# Patient Record
Sex: MALE | Race: WHITE | NOT HISPANIC OR LATINO | Employment: OTHER | URBAN - NONMETROPOLITAN AREA
[De-identification: names, ages, dates, MRNs, and addresses within clinical notes are randomized per-mention and may not be internally consistent; named-entity substitution may affect disease eponyms.]

---

## 2022-08-02 ENCOUNTER — OFFICE VISIT (OUTPATIENT)
Dept: PULMONOLOGY | Facility: CLINIC | Age: 67
End: 2022-08-02

## 2022-08-02 ENCOUNTER — LAB (OUTPATIENT)
Dept: LAB | Facility: HOSPITAL | Age: 67
End: 2022-08-02

## 2022-08-02 VITALS
TEMPERATURE: 97.1 F | HEIGHT: 72 IN | OXYGEN SATURATION: 95 % | SYSTOLIC BLOOD PRESSURE: 130 MMHG | DIASTOLIC BLOOD PRESSURE: 70 MMHG | WEIGHT: 200 LBS | HEART RATE: 72 BPM | BODY MASS INDEX: 27.09 KG/M2

## 2022-08-02 DIAGNOSIS — J43.9 PULMONARY EMPHYSEMA, UNSPECIFIED EMPHYSEMA TYPE: ICD-10-CM

## 2022-08-02 DIAGNOSIS — R06.02 SHORTNESS OF BREATH: Primary | ICD-10-CM

## 2022-08-02 DIAGNOSIS — J44.9 CHRONIC OBSTRUCTIVE PULMONARY DISEASE, UNSPECIFIED COPD TYPE: ICD-10-CM

## 2022-08-02 DIAGNOSIS — J41.0 CHRONIC BRONCHITIS, SIMPLE: ICD-10-CM

## 2022-08-02 DIAGNOSIS — J30.89 OTHER ALLERGIC RHINITIS: ICD-10-CM

## 2022-08-02 DIAGNOSIS — F17.210 NICOTINE DEPENDENCE, CIGARETTES, UNCOMPLICATED: ICD-10-CM

## 2022-08-02 DIAGNOSIS — R06.02 SHORTNESS OF BREATH: ICD-10-CM

## 2022-08-02 PROCEDURE — 82104 ALPHA-1-ANTITRYPSIN PHENO: CPT

## 2022-08-02 PROCEDURE — 36415 COLL VENOUS BLD VENIPUNCTURE: CPT

## 2022-08-02 PROCEDURE — 82103 ALPHA-1-ANTITRYPSIN TOTAL: CPT

## 2022-08-02 PROCEDURE — 99204 OFFICE O/P NEW MOD 45 MIN: CPT | Performed by: INTERNAL MEDICINE

## 2022-08-02 RX ORDER — FLUTICASONE PROPIONATE 50 MCG
2 SPRAY, SUSPENSION (ML) NASAL DAILY
COMMUNITY

## 2022-08-02 RX ORDER — CARVEDILOL 12.5 MG/1
12.5 TABLET ORAL EVERY 12 HOURS SCHEDULED
COMMUNITY

## 2022-08-02 RX ORDER — ATORVASTATIN CALCIUM 40 MG/1
40 TABLET, FILM COATED ORAL DAILY
COMMUNITY
End: 2023-03-02

## 2022-08-02 RX ORDER — HYDROCODONE BITARTRATE AND ACETAMINOPHEN 5; 325 MG/1; MG/1
1 TABLET ORAL EVERY 8 HOURS PRN
COMMUNITY
Start: 2022-07-14

## 2022-08-02 RX ORDER — GLYCOPYRROLATE AND FORMOTEROL FUMARATE 9; 4.8 UG/1; UG/1
AEROSOL, METERED RESPIRATORY (INHALATION) EVERY 12 HOURS SCHEDULED
COMMUNITY
End: 2022-11-04

## 2022-08-02 RX ORDER — MELOXICAM 7.5 MG/1
7.5 TABLET ORAL DAILY
COMMUNITY
End: 2023-03-02

## 2022-08-02 RX ORDER — AMLODIPINE BESYLATE 5 MG/1
5 TABLET ORAL DAILY
COMMUNITY
End: 2023-03-02

## 2022-08-02 RX ORDER — AZELASTINE 1 MG/ML
1 SPRAY, METERED NASAL 2 TIMES DAILY PRN
Qty: 1 EACH | Refills: 5 | Status: SHIPPED | OUTPATIENT
Start: 2022-08-02 | End: 2023-03-02 | Stop reason: SDUPTHER

## 2022-08-02 RX ORDER — ALBUTEROL SULFATE 90 UG/1
AEROSOL, METERED RESPIRATORY (INHALATION) EVERY 4 HOURS
COMMUNITY
End: 2023-03-02 | Stop reason: SDUPTHER

## 2022-08-02 RX ORDER — CETIRIZINE HYDROCHLORIDE 10 MG/1
10 TABLET ORAL DAILY
COMMUNITY
End: 2023-03-02

## 2022-08-02 RX ORDER — ALBUTEROL SULFATE 2.5 MG/3ML
SOLUTION RESPIRATORY (INHALATION)
COMMUNITY

## 2022-08-02 RX ORDER — AZELASTINE 1 MG/ML
SPRAY, METERED NASAL
COMMUNITY
End: 2022-08-02

## 2022-08-02 RX ORDER — APREMILAST 30 MG/1
TABLET, FILM COATED ORAL 2 TIMES DAILY
COMMUNITY
Start: 2022-07-15

## 2022-08-02 NOTE — PROGRESS NOTES
"  CONSULT NOTE    Requested by:   Whit Mcnair DO Bobrowski, Brittany, DO      Chief Complaint   Patient presents with   • COPD       Subjective:  Brooks Brice is a 67 y.o. male.     History of Present Illness   Patient was sent in today for evaluation of shortness of breath. Patient says that for the past few years, he has had shortness of breath. Patient has had decreased exercise capacity that has been progressive for the past few years. Patient also says that he brings up phlegm in the morning along with cough.     he also reports having 1-2 recent episodes of \"bronchitis\", over the past 2 years.    Patient also notes having progressive worsening in his ability to walk up the hill or walk up a flight of stairs.     The patient is currently not on oxygen.     Patient reports smoking 3-4 packs per day for the past 40 years.  he is currently smoking 1.5 packs a day.    Patient does not have a family history of lung diseases.    Patient says that he has been using his nasal sprays on a regular basis and describes no significant ongoing issues other than occasional congestion.       The following portions of the patient's history were reviewed and updated as appropriate: allergies, current medications, past family history, past medical history, past social history and past surgical history.    Review of Systems   HENT: Negative.    Respiratory: Positive for cough, shortness of breath and wheezing. Negative for apnea, choking and chest tightness.    Cardiovascular: Negative for chest pain, palpitations and leg swelling.   All other systems reviewed and are negative.      History reviewed. No pertinent past medical history.   Past medical history includes hypertension, hyperlipidemia and allergic rhinitis.    Social History     Tobacco Use   • Smoking status: Current Every Day Smoker     Packs/day: 1.50     Types: Cigarettes     Start date: 1978   • Smokeless tobacco: Not on file   Substance Use Topics   • " "Alcohol use: Not Currently         Objective:  Visit Vitals  /70   Pulse 72   Temp 97.1 °F (36.2 °C) (Temporal)   Ht 182.9 cm (72\")   Wt 90.7 kg (200 lb)   SpO2 95%   BMI 27.12 kg/m²       Physical Exam  Vitals reviewed.   Constitutional:       Appearance: He is well-developed.   HENT:      Head:      Comments: No acute lesions noted     Nose:      Comments: Nasal erythema noted.     Mouth/Throat:      Mouth: Mucous membranes are moist.      Comments: Oropharynx was somewhat cobblestoned.  Neck:      Vascular: No JVD.   Cardiovascular:      Rate and Rhythm: Normal rate and regular rhythm.   Pulmonary:      Effort: Pulmonary effort is normal.      Breath sounds: Wheezing present. No rales.      Comments: Somewhat hyperresonant to percussion.  Somewhat decreased air entry.  Mild scattered wheezing noted.   Musculoskeletal:      Cervical back: Neck supple.      Right lower leg: No edema.      Left lower leg: No edema.      Comments: Gait was normal.   Skin:     General: Skin is warm and dry.   Neurological:      Mental Status: He is alert and oriented to person, place, and time.   Psychiatric:         Mood and Affect: Mood normal.         Behavior: Behavior normal.           Assessment/Plan:  Diagnoses and all orders for this visit:    1. Shortness of breath (Primary)  -     Overnight Sleep Oximetry Study; Future  -     Pulmonary Function Test; Future  -     Alpha - 1 - Antitrypsin Phenotype; Future    2. Chronic obstructive pulmonary disease, unspecified COPD type (HCC)  -     Overnight Sleep Oximetry Study; Future  -     Pulmonary Function Test; Future  -     Alpha - 1 - Antitrypsin Phenotype; Future    3. Chronic bronchitis, simple (HCC)    4. Nicotine dependence, cigarettes, uncomplicated  -      CT Chest Low Dose Cancer Screening WO; Future    5. Other allergic rhinitis    6. Pulmonary emphysema, unspecified emphysema type (HCC)  -     Overnight Sleep Oximetry Study; Future  -     Alpha - 1 - Antitrypsin " Phenotype; Future    Other orders  -     azelastine (ASTELIN) 0.1 % nasal spray; 1 spray into the nostril(s) as directed by provider 2 (Two) Times a Day As Needed for Rhinitis or Allergies. Use in each nostril as directed  Dispense: 1 each; Refill: 5        Return in about 3 months (around 11/2/2022) for Recheck, PFT F/U, Imaging, Overnight P Ox, For Fabi Johnson), ...Also 7 mths w/Janet.    DISCUSSION(if any):  Last CT scan was reviewed in great detail with the patient. Images reviewed personally.   Interval decrease in size of right basilar pulmonary nodule measuring 6 mm, previously 9 mm. It also showed centrilobular emphysema.     CT of the sinuses was also reviewed which did not show any acute abnormality.    I have also reviewed his provider's office note that mentions possible COPD and symptoms of URI with cough, headache and pharyngitis.     ===========================  ===========================    PFTs were ordered for follow up visit.     ===========================  ===========================    Orders as above.    I have told the patient, that in my view, shortness of breath is most likely from underlying chronic obstructive lung disease.     Patient was given education and demonstration on how to use the medicine.     Side effects, of prescribed medicines, discussed.    Patient was also instructed on compliance and adherence with instructions.     I have discussed the need to quit smoking as soon as possible.    Patient was offered modalities such as Chantix/nicotine patches/Wellbutrin to aid in smoking cessation.    The patient will get back to us regarding the choice, once a decision has been taken.     Patient was given reading material, as appropriate.     The patient belongs to the risk group for which lung cancer screening has been recommended. We will try to make arrangements for the same and this will be indicated soon. This has been ordered.    Patient was asked to call with any  concerns.     Patient will be followed clinically to assess for response to treatment and further recommendations will be made, based on response.    Due to symptoms suggestive of poorly controlled allergic rhinitis, he was prescribed additional nasal spray with the advise to use all the recommended/prescribed nasal sprays on a regular basis.      Dictated utilizing Dragon dictation.    This document was electronically signed by Steve Mancia MD on 08/02/22 at 10:51 EDT

## 2022-08-08 LAB
A1AT PHENOTYP SERPL IFE: ABNORMAL
A1AT SERPL-MCNC: 224 MG/DL (ref 101–187)

## 2022-08-18 ENCOUNTER — HOSPITAL ENCOUNTER (OUTPATIENT)
Dept: CT IMAGING | Facility: HOSPITAL | Age: 67
Discharge: HOME | End: 2022-08-18
Admitting: INTERNAL MEDICINE

## 2022-08-18 DIAGNOSIS — F17.210 NICOTINE DEPENDENCE, CIGARETTES, UNCOMPLICATED: ICD-10-CM

## 2022-08-18 PROCEDURE — 71271 CT THORAX LUNG CANCER SCR C-: CPT

## 2022-08-25 DIAGNOSIS — J44.9 CHRONIC OBSTRUCTIVE PULMONARY DISEASE, UNSPECIFIED COPD TYPE: ICD-10-CM

## 2022-08-25 DIAGNOSIS — R06.02 SHORTNESS OF BREATH: ICD-10-CM

## 2022-08-25 DIAGNOSIS — J43.9 PULMONARY EMPHYSEMA, UNSPECIFIED EMPHYSEMA TYPE: ICD-10-CM

## 2022-10-12 ENCOUNTER — TRANSCRIBE ORDERS (OUTPATIENT)
Dept: ADMINISTRATIVE | Facility: HOSPITAL | Age: 67
End: 2022-10-12

## 2022-10-12 DIAGNOSIS — R94.39 ABNORMAL STRESS TEST: Primary | ICD-10-CM

## 2022-10-12 DIAGNOSIS — I20.0 UNSTABLE ANGINA PECTORIS: ICD-10-CM

## 2022-10-25 ENCOUNTER — HOSPITAL ENCOUNTER (OUTPATIENT)
Facility: HOSPITAL | Age: 67
Setting detail: HOSPITAL OUTPATIENT SURGERY
Discharge: HOME | End: 2022-10-25
Attending: INTERNAL MEDICINE | Admitting: INTERNAL MEDICINE

## 2022-10-25 VITALS
HEIGHT: 70 IN | WEIGHT: 208.78 LBS | TEMPERATURE: 97.2 F | OXYGEN SATURATION: 97 % | SYSTOLIC BLOOD PRESSURE: 137 MMHG | BODY MASS INDEX: 29.89 KG/M2 | RESPIRATION RATE: 16 BRPM | DIASTOLIC BLOOD PRESSURE: 83 MMHG | HEART RATE: 71 BPM

## 2022-10-25 DIAGNOSIS — I20.0 UNSTABLE ANGINA PECTORIS: ICD-10-CM

## 2022-10-25 DIAGNOSIS — R94.39 ABNORMAL STRESS TEST: ICD-10-CM

## 2022-10-25 LAB
ANION GAP SERPL CALCULATED.3IONS-SCNC: 7 MMOL/L (ref 5–15)
BUN SERPL-MCNC: 14 MG/DL (ref 8–23)
BUN/CREAT SERPL: 13.6 (ref 7–25)
CALCIUM SPEC-SCNC: 9.7 MG/DL (ref 8.6–10.5)
CHLORIDE SERPL-SCNC: 104 MMOL/L (ref 98–107)
CO2 SERPL-SCNC: 28 MMOL/L (ref 22–29)
CREAT BLDA-MCNC: 1 MG/DL (ref 0.6–1.3)
CREAT SERPL-MCNC: 1.03 MG/DL (ref 0.76–1.27)
DEPRECATED RDW RBC AUTO: 45.7 FL (ref 37–54)
EGFRCR SERPLBLD CKD-EPI 2021: 79.6 ML/MIN/1.73
ERYTHROCYTE [DISTWIDTH] IN BLOOD BY AUTOMATED COUNT: 13.1 % (ref 12.3–15.4)
GLUCOSE SERPL-MCNC: 130 MG/DL (ref 65–99)
HCT VFR BLD AUTO: 47.4 % (ref 37.5–51)
HGB BLD-MCNC: 16.1 G/DL (ref 13–17.7)
MCH RBC QN AUTO: 32.1 PG (ref 26.6–33)
MCHC RBC AUTO-ENTMCNC: 34 G/DL (ref 31.5–35.7)
MCV RBC AUTO: 94.6 FL (ref 79–97)
PLATELET # BLD AUTO: 241 10*3/MM3 (ref 140–450)
PMV BLD AUTO: 9.5 FL (ref 6–12)
POTASSIUM SERPL-SCNC: 3.8 MMOL/L (ref 3.5–5.2)
RBC # BLD AUTO: 5.01 10*6/MM3 (ref 4.14–5.8)
SODIUM SERPL-SCNC: 139 MMOL/L (ref 136–145)
WBC NRBC COR # BLD: 10.08 10*3/MM3 (ref 3.4–10.8)

## 2022-10-25 PROCEDURE — 80048 BASIC METABOLIC PNL TOTAL CA: CPT | Performed by: INTERNAL MEDICINE

## 2022-10-25 PROCEDURE — 25010000002 HEPARIN (PORCINE) PER 1000 UNITS: Performed by: INTERNAL MEDICINE

## 2022-10-25 PROCEDURE — C1887 CATHETER, GUIDING: HCPCS | Performed by: INTERNAL MEDICINE

## 2022-10-25 PROCEDURE — 85027 COMPLETE CBC AUTOMATED: CPT | Performed by: INTERNAL MEDICINE

## 2022-10-25 PROCEDURE — C1874 STENT, COATED/COV W/DEL SYS: HCPCS | Performed by: INTERNAL MEDICINE

## 2022-10-25 PROCEDURE — 25010000002 FENTANYL CITRATE (PF) 50 MCG/ML SOLUTION: Performed by: INTERNAL MEDICINE

## 2022-10-25 PROCEDURE — C1769 GUIDE WIRE: HCPCS | Performed by: INTERNAL MEDICINE

## 2022-10-25 PROCEDURE — 0 IOPAMIDOL PER 1 ML: Performed by: INTERNAL MEDICINE

## 2022-10-25 PROCEDURE — C1725 CATH, TRANSLUMIN NON-LASER: HCPCS | Performed by: INTERNAL MEDICINE

## 2022-10-25 PROCEDURE — C1894 INTRO/SHEATH, NON-LASER: HCPCS | Performed by: INTERNAL MEDICINE

## 2022-10-25 PROCEDURE — C9600 PERC DRUG-EL COR STENT SING: HCPCS | Performed by: INTERNAL MEDICINE

## 2022-10-25 PROCEDURE — 82565 ASSAY OF CREATININE: CPT

## 2022-10-25 PROCEDURE — 93458 L HRT ARTERY/VENTRICLE ANGIO: CPT | Performed by: INTERNAL MEDICINE

## 2022-10-25 PROCEDURE — 25010000002 BIVALIRUDIN TRIFLUOROACETATE 250 MG RECONSTITUTED SOLUTION 1 EACH VIAL: Performed by: INTERNAL MEDICINE

## 2022-10-25 PROCEDURE — 36415 COLL VENOUS BLD VENIPUNCTURE: CPT

## 2022-10-25 PROCEDURE — 25010000002 MIDAZOLAM PER 1 MG: Performed by: INTERNAL MEDICINE

## 2022-10-25 PROCEDURE — 93005 ELECTROCARDIOGRAM TRACING: CPT | Performed by: INTERNAL MEDICINE

## 2022-10-25 DEVICE — XIENCE SKYPOINT™ EVEROLIMUS ELUTING CORONARY STENT SYSTEM 3.50 MM X 23 MM / RAPID-EXCHANGE
Type: IMPLANTABLE DEVICE | Status: FUNCTIONAL
Brand: XIENCE SKYPOINT™

## 2022-10-25 RX ORDER — LIDOCAINE HYDROCHLORIDE 10 MG/ML
INJECTION, SOLUTION EPIDURAL; INFILTRATION; INTRACAUDAL; PERINEURAL
Status: DISCONTINUED | OUTPATIENT
Start: 2022-10-25 | End: 2022-10-25 | Stop reason: HOSPADM

## 2022-10-25 RX ORDER — MIDAZOLAM HYDROCHLORIDE 1 MG/ML
INJECTION INTRAMUSCULAR; INTRAVENOUS
Status: DISCONTINUED | OUTPATIENT
Start: 2022-10-25 | End: 2022-10-25 | Stop reason: HOSPADM

## 2022-10-25 RX ORDER — CLOPIDOGREL BISULFATE 75 MG/1
TABLET ORAL
Status: DISCONTINUED | OUTPATIENT
Start: 2022-10-25 | End: 2022-10-25 | Stop reason: HOSPADM

## 2022-10-25 RX ORDER — FENTANYL CITRATE 50 UG/ML
INJECTION, SOLUTION INTRAMUSCULAR; INTRAVENOUS
Status: DISCONTINUED | OUTPATIENT
Start: 2022-10-25 | End: 2022-10-25 | Stop reason: HOSPADM

## 2022-10-25 RX ORDER — HEPARIN SODIUM 1000 [USP'U]/ML
INJECTION, SOLUTION INTRAVENOUS; SUBCUTANEOUS
Status: DISCONTINUED | OUTPATIENT
Start: 2022-10-25 | End: 2022-10-25 | Stop reason: HOSPADM

## 2022-10-25 RX ORDER — NICARDIPINE HCL-0.9% SOD CHLOR 1 MG/10 ML
SYRINGE (ML) INTRAVENOUS
Status: DISCONTINUED | OUTPATIENT
Start: 2022-10-25 | End: 2022-10-25 | Stop reason: HOSPADM

## 2022-10-25 RX ORDER — ASPIRIN 81 MG/1
81 TABLET ORAL DAILY
COMMUNITY

## 2022-10-25 RX ORDER — CLOPIDOGREL BISULFATE 75 MG/1
75 TABLET ORAL DAILY
Qty: 90 TABLET | Refills: 3 | Status: SHIPPED | OUTPATIENT
Start: 2022-10-25

## 2022-10-25 NOTE — H&P
[facility] History & Physical    Referring Provider: Bob Pabon MD    Patient Care Team:  Whit Mcnair DO as PCP - General (Family Medicine)    Chief complaint chest pain    Subjective .     History of present illness: 67-year-old smoker with hypertension and hyperlipidemia and type 2 diabetes mellitus admitted for cardiac catheterization.  He has a 3-month history of increasing episodes of substernal chest pressure prompted by overactivity.  Pain is a pressure that radiates into his left shoulder and is associated with nausea and diaphoresis.  It last for a few minutes and relieved by rest.  He has had no prior episodes of myocardial myocardial infarction, stroke, TIA or congestive heart failure.  Echocardiography in the office revealed aortic valve sclerosis.  Myocardial perfusion imaging revealed a reversible inferior wall defect.  He is now been admitted for cardiac catheterization.    Review of System  A 14 point review of systems was negative except as was stated in the HPI    History  Past Medical History:   Diagnosis Date   • COPD (chronic obstructive pulmonary disease) (HCC)    • Hyperlipidemia    • Hypertension    • Psoriasis      Past Surgical History:   Procedure Laterality Date   • APPENDECTOMY       Family History   Problem Relation Age of Onset   • Emphysema Father      Social History     Tobacco Use   • Smoking status: Every Day     Packs/day: 1.50     Types: Cigarettes     Start date: 1978   • Smokeless tobacco: Never   Substance Use Topics   • Alcohol use: Not Currently   • Drug use: Never     Medications Prior to Admission   Medication Sig Dispense Refill Last Dose   • albuterol (PROVENTIL) (2.5 MG/3ML) 0.083% nebulizer solution albuterol sulfate 2.5 mg/3 mL (0.083 %) solution for nebulization      • albuterol sulfate  (90 Base) MCG/ACT inhaler Every 4 (Four) Hours.      • amLODIPine (NORVASC) 5 MG tablet Take 1 tablet by mouth Daily.   10/25/2022   •  "aspirin 81 MG EC tablet Take 1 tablet by mouth Daily.   10/24/2022   • atorvastatin (LIPITOR) 40 MG tablet Take 1 tablet by mouth Daily.   10/25/2022   • azelastine (ASTELIN) 0.1 % nasal spray 1 spray into the nostril(s) as directed by provider 2 (Two) Times a Day As Needed for Rhinitis or Allergies. Use in each nostril as directed 1 each 5    • carvedilol (COREG) 12.5 MG tablet Take 1 tablet by mouth Every 12 (Twelve) Hours.   10/25/2022   • cetirizine (zyrTEC) 10 MG tablet Take 1 tablet by mouth Daily.   10/25/2022   • fluticasone (FLONASE) 50 MCG/ACT nasal spray 2 sprays into the nostril(s) as directed by provider Daily.   10/25/2022   • Glycopyrrolate-Formoterol (Bevespi Aerosphere) 9-4.8 MCG/ACT aerosol Every 12 (Twelve) Hours.   10/25/2022   • HYDROcodone-acetaminophen (NORCO) 5-325 MG per tablet Take 1 tablet by mouth Every 8 (Eight) Hours As Needed.   10/24/2022   • meloxicam (MOBIC) 7.5 MG tablet Take 1 tablet by mouth Daily.   10/25/2022   • Otezla 30 MG tablet Take  by mouth 2 (Two) Times a Day.   10/25/2022     Scheduled Meds:    Continuous Infusions:  No current facility-administered medications for this encounter.    PRN Meds:    Allergies:  Patient has no known allergies.    Objective     Vital Sign Min/Max for last 24 hours  Temp  Min: 97.2 °F (36.2 °C)  Max: 97.2 °F (36.2 °C)   BP  Min: 136/94  Max: 145/83   Pulse  Min: 72  Max: 72   No data recorded   SpO2  Min: 97 %  Max: 97 %   No data recorded   Weight  Min: 94.7 kg (208 lb 12.4 oz)  Max: 94.7 kg (208 lb 12.4 oz)     Flowsheet Rows    Flowsheet Row First Filed Value   Admission Height 177.8 cm (70\") Documented at 10/25/2022 0621   Admission Weight 94.7 kg (208 lb 12.4 oz) Documented at 10/25/2022 0621             Physical Exam:  General Appearance: Alert, appears stated age and cooperative  Lungs: Clear to ascultation  Heart:: RRR   No Murmurs, Rubs or Gallops  Abdomen: Soft and nontender with adequate bowel sounds.  No " organomegaly  Extremities: No cyanosis, clubbing or edema  Pulses: Pulses palpable and equal bilaterally  Skin: Warm and dry with no rash  Psych: Normal    Results Review:   I reviewed the patient's new clinical results.  Results from last 7 days   Lab Units 10/25/22  0623   WBC 10*3/mm3 10.08   HEMOGLOBIN g/dL 16.1   HEMATOCRIT % 47.4   PLATELETS 10*3/mm3 241     Results from last 7 days   Lab Units 10/25/22  0629   CREATININE mg/dL 1.00     No results found for: TROPONINT                      Abnormal stress test    Unstable angina pectoris (HCC)      Impression      Chest pain/class III angina with abnormal myocardial perfusion imaging        Plan       Left heart catheterization with possible intervention today      I discussed the patients findings and my recommendations with patient    Bob Pabon MD   10/25/22  07:11 EDT

## 2022-10-26 ENCOUNTER — DOCUMENTATION (OUTPATIENT)
Dept: CARDIAC REHAB | Facility: HOSPITAL | Age: 67
End: 2022-10-26

## 2022-10-26 ENCOUNTER — CALL CENTER PROGRAMS (OUTPATIENT)
Dept: CALL CENTER | Facility: HOSPITAL | Age: 67
End: 2022-10-26

## 2022-10-26 LAB
QT INTERVAL: 434 MS
QTC INTERVAL: 447 MS

## 2022-10-26 NOTE — OUTREACH NOTE
PCI/Device Survey    Flowsheet Row Responses   Facility patient discharged from? [facility]   Procedure date 10/25/22   Procedure (if device, specify in description) PCI   PCI site Right, Arm   Performing MD Dr. Bob Pabon   Attempt successful? Yes   Call start time [phone]   Call end time [phone]   Has the patient had any of the following symptoms since discharge? Dizziness or lightheadedness   Nursing interventions Advised to call MD   Symptom comments having a little lightheadedness but he is resting today, says he will call MD if it does not improve or gets worse   Is the patient taking prescribed medications: Plavix   Nursing intervention Reminded to continue to take prescribed medications   Does the patient have any of the following symptoms related to the cath/surgical site? --  [no symptoms noted]   Nursing intervention Patient education provided   Does the patient have an appointment scheduled with the cardiologist? No   Appointment comments says he was told they would call with a f/u appt   Did the patient feel prepared to go home on the same day as the procedure? Yes   Is the patient satisfied with the same day discharge process? Yes   PCI/Device call completed Yes   Wrap up additional comments Doing well, no questions.          BLANCA ALEJANDRO - Registered Nurse

## 2022-10-28 ENCOUNTER — DOCUMENTATION (OUTPATIENT)
Dept: CARDIAC REHAB | Facility: HOSPITAL | Age: 67
End: 2022-10-28

## 2022-10-28 NOTE — PROGRESS NOTES
Cardiac Rehab staff mailed referral letter to patient regarding Phase II Cardiac Rehab program. Instruction for patient to contact [facility] Cardiac Rehab Department for additional program information and to forward referral to closest Cardiac Rehab program.

## 2022-11-04 ENCOUNTER — OFFICE VISIT (OUTPATIENT)
Dept: PULMONOLOGY | Facility: CLINIC | Age: 67
End: 2022-11-04

## 2022-11-04 VITALS
OXYGEN SATURATION: 100 % | TEMPERATURE: 97.3 F | BODY MASS INDEX: 29.78 KG/M2 | HEIGHT: 70 IN | RESPIRATION RATE: 14 BRPM | SYSTOLIC BLOOD PRESSURE: 120 MMHG | HEART RATE: 81 BPM | DIASTOLIC BLOOD PRESSURE: 80 MMHG | WEIGHT: 208 LBS

## 2022-11-04 DIAGNOSIS — J43.9 PULMONARY EMPHYSEMA, UNSPECIFIED EMPHYSEMA TYPE: ICD-10-CM

## 2022-11-04 DIAGNOSIS — J44.9 CHRONIC OBSTRUCTIVE PULMONARY DISEASE, UNSPECIFIED COPD TYPE: Primary | ICD-10-CM

## 2022-11-04 DIAGNOSIS — J44.9 CHRONIC OBSTRUCTIVE PULMONARY DISEASE, UNSPECIFIED COPD TYPE: ICD-10-CM

## 2022-11-04 DIAGNOSIS — I25.10 CORONARY ARTERY DISEASE INVOLVING NATIVE CORONARY ARTERY OF NATIVE HEART WITHOUT ANGINA PECTORIS: ICD-10-CM

## 2022-11-04 DIAGNOSIS — R06.02 SHORTNESS OF BREATH: ICD-10-CM

## 2022-11-04 DIAGNOSIS — F17.210 CIGARETTE NICOTINE DEPENDENCE WITHOUT COMPLICATION: ICD-10-CM

## 2022-11-04 PROCEDURE — 94726 PLETHYSMOGRAPHY LUNG VOLUMES: CPT | Performed by: INTERNAL MEDICINE

## 2022-11-04 PROCEDURE — 99214 OFFICE O/P EST MOD 30 MIN: CPT | Performed by: NURSE PRACTITIONER

## 2022-11-04 PROCEDURE — 94060 EVALUATION OF WHEEZING: CPT | Performed by: INTERNAL MEDICINE

## 2022-11-04 PROCEDURE — 94729 DIFFUSING CAPACITY: CPT | Performed by: INTERNAL MEDICINE

## 2022-11-04 RX ORDER — LEVOFLOXACIN 500 MG/1
TABLET, FILM COATED ORAL
COMMUNITY
Start: 2022-10-27

## 2022-11-04 RX ORDER — APREMILAST 30 MG/1
TABLET, FILM COATED ORAL EVERY 12 HOURS
COMMUNITY
Start: 2022-07-13

## 2022-11-04 RX ORDER — NICOTINE 21 MG/24HR
1 PATCH, TRANSDERMAL 24 HOURS TRANSDERMAL EVERY 24 HOURS
Qty: 1 EACH | Refills: 1 | Status: SHIPPED | OUTPATIENT
Start: 2022-11-04

## 2022-11-04 RX ORDER — TIOTROPIUM BROMIDE AND OLODATEROL 3.124; 2.736 UG/1; UG/1
2 SPRAY, METERED RESPIRATORY (INHALATION)
Qty: 1 EACH | Refills: 5 | Status: SHIPPED | OUTPATIENT
Start: 2022-11-04 | End: 2023-03-02 | Stop reason: SDUPTHER

## 2022-11-04 NOTE — PROGRESS NOTES
Follow Up Office Visit      Patient Name: Brooks Brice    Chief Complaint:    Chief Complaint   Patient presents with   • Shortness of Breath     followup       History of Present Illness: Brooks Brice is a 67 y.o. male who is here today for follow up of COPD.     Since last visit, he denies any exacerbations. He reports having a coronary stent placed last month and has since noticed improvement in his shortness of breath. He reports dry coughing and wheezing in the mornings. He reports his is using his Bevespi inhaler daily as prescribed. He tried Stiolto samples from his PCP and liked it better than Bevespi. He continues to smoke. The patient is interested in nicotine replacement.     Subjective      Review of Systems:  Review of Systems   Constitutional: Negative for fever and unexpected weight change.   Respiratory:        See HPI   Cardiovascular: Negative for chest pain and leg swelling.   Allergic/Immunologic: Positive for environmental allergies.        Past Medical History:   Past Medical History:   Diagnosis Date   • COPD (chronic obstructive pulmonary disease) (HCC)    • Hyperlipidemia    • Hypertension    • Psoriasis        Past Surgical History:   Past Surgical History:   Procedure Laterality Date   • APPENDECTOMY     • CARDIAC CATHETERIZATION N/A 10/25/2022    Procedure: Left Heart Cath;  Surgeon: Bob Pabon MD;  Location: [facility] CATH INVASIVE LOCATION;  Service: Cardiology;  Laterality: N/A;       Family History:   Family History   Problem Relation Age of Onset   • Emphysema Father        Social History:   Social History     Socioeconomic History   • Marital status:    Tobacco Use   • Smoking status: Every Day     Packs/day: 1.50     Types: Cigarettes     Start date: 1978   • Smokeless tobacco: Never   Substance and Sexual Activity   • Alcohol use: Not Currently   • Drug use: Never       Current Medications:     Current Outpatient Medications:   •  albuterol (PROVENTIL) (2.5  "MG/3ML) 0.083% nebulizer solution, albuterol sulfate 2.5 mg/3 mL (0.083 %) solution for nebulization, Disp: , Rfl:   •  albuterol sulfate  (90 Base) MCG/ACT inhaler, Every 4 (Four) Hours., Disp: , Rfl:   •  amLODIPine (NORVASC) 5 MG tablet, Take 1 tablet by mouth Daily., Disp: , Rfl:   •  Apremilast (Otezla) 30 MG tablet, Take  by mouth Every 12 (Twelve) Hours., Disp: , Rfl:   •  aspirin 81 MG EC tablet, Take 1 tablet by mouth Daily., Disp: , Rfl:   •  atorvastatin (LIPITOR) 40 MG tablet, Take 1 tablet by mouth Daily., Disp: , Rfl:   •  azelastine (ASTELIN) 0.1 % nasal spray, 1 spray into the nostril(s) as directed by provider 2 (Two) Times a Day As Needed for Rhinitis or Allergies. Use in each nostril as directed, Disp: 1 each, Rfl: 5  •  carvedilol (COREG) 12.5 MG tablet, Take 1 tablet by mouth Every 12 (Twelve) Hours., Disp: , Rfl:   •  cetirizine (zyrTEC) 10 MG tablet, Take 1 tablet by mouth Daily., Disp: , Rfl:   •  clopidogrel (PLAVIX) 75 MG tablet, Take 1 tablet by mouth Daily., Disp: 90 tablet, Rfl: 3  •  fluticasone (FLONASE) 50 MCG/ACT nasal spray, 2 sprays into the nostril(s) as directed by provider Daily., Disp: , Rfl:   •  Glycopyrrolate-Formoterol (Bevespi Aerosphere) 9-4.8 MCG/ACT aerosol, Every 12 (Twelve) Hours., Disp: , Rfl:   •  HYDROcodone-acetaminophen (NORCO) 5-325 MG per tablet, Take 1 tablet by mouth Every 8 (Eight) Hours As Needed., Disp: , Rfl:   •  meloxicam (MOBIC) 7.5 MG tablet, Take 1 tablet by mouth Daily., Disp: , Rfl:   •  Otezla 30 MG tablet, Take  by mouth 2 (Two) Times a Day., Disp: , Rfl:   •  levoFLOXacin (LEVAQUIN) 500 MG tablet, , Disp: , Rfl:      Allergies:   No Known Allergies    Objective     Physical Exam:  Vital Signs:   Vitals:    11/04/22 1054   BP: 120/80   Pulse: 81   Resp: 14   Temp: 97.3 °F (36.3 °C)   SpO2: 100%   Weight: 94.3 kg (208 lb)   Height: 177.8 cm (70\")     Body mass index is 29.84 kg/m².    Physical Exam  Vitals reviewed.   Constitutional:       " General: He is not in acute distress.     Appearance: He is not toxic-appearing.   HENT:      Head: Normocephalic and atraumatic.   Eyes:      Extraocular Movements: Extraocular movements intact.   Cardiovascular:      Rate and Rhythm: Normal rate.      Pulses: Normal pulses.   Pulmonary:      Effort: Pulmonary effort is normal.      Breath sounds: Normal breath sounds.   Abdominal:      General: There is no distension.   Musculoskeletal:         General: No swelling.      Cervical back: Neck supple.   Skin:     General: Skin is warm and dry.      Findings: No rash.   Neurological:      General: No focal deficit present.      Mental Status: He is alert and oriented to person, place, and time.   Psychiatric:         Mood and Affect: Mood normal.         Behavior: Behavior normal.       Results Review:   2022 A1AT phenotype MM, level 224    PFTs obtained on 11/04/2022 revealed severe COPD with FEV1 of 35%.    Chest CT scan obtained in 08/2022, revealed no concerning lung nodule. +severe emphysema.    WBC   Date Value Ref Range Status   10/25/2022 10.08 3.40 - 10.80 10*3/mm3 Final     RBC   Date Value Ref Range Status   10/25/2022 5.01 4.14 - 5.80 10*6/mm3 Final     Hemoglobin   Date Value Ref Range Status   10/25/2022 16.1 13.0 - 17.7 g/dL Final     Hematocrit   Date Value Ref Range Status   10/25/2022 47.4 37.5 - 51.0 % Final     MCV   Date Value Ref Range Status   10/25/2022 94.6 79.0 - 97.0 fL Final     MCH   Date Value Ref Range Status   10/25/2022 32.1 26.6 - 33.0 pg Final     MCHC   Date Value Ref Range Status   10/25/2022 34.0 31.5 - 35.7 g/dL Final     RDW   Date Value Ref Range Status   10/25/2022 13.1 12.3 - 15.4 % Final     RDW-SD   Date Value Ref Range Status   10/25/2022 45.7 37.0 - 54.0 fl Final     MPV   Date Value Ref Range Status   10/25/2022 9.5 6.0 - 12.0 fL Final     Platelets   Date Value Ref Range Status   10/25/2022 241 140 - 450 10*3/mm3 Final     Lab Results   Component Value Date    GLUCOSE  130 (H) 10/25/2022    BUN 14 10/25/2022    CREATININE 1.00 10/25/2022    BCR 13.6 10/25/2022    K 3.8 10/25/2022    CO2 28.0 10/25/2022    CALCIUM 9.7 10/25/2022       Assessment / Plan      Assessment/Plan:   Diagnoses and all orders for this visit:    1. Chronic obstructive pulmonary disease, unspecified COPD type (HCC) (Primary)  -     tiotropium bromide-olodaterol (Stiolto Respimat) 2.5-2.5 MCG/ACT aerosol solution inhaler; Inhale 2 puffs Daily.  Dispense: 1 each; Refill: 5  FEV1 of 35%. Stable symptoms without exacerbations. Stop Bevespi. Start Stiolto. Patient instructed to contact their insurance company and make sure that the medications prescribed are on their formulary and the lowest cost/tier for them. They will call the clinic back if different medications need to prescribed.     2. Cigarette nicotine dependence without complication  -     nicotine (Nicoderm CQ) 21 MG/24HR patch; Place 1 patch on the skin as directed by provider Daily.  Dispense: 1 each; Refill: 1  -     nicotine polacrilex (Nicorette) 4 MG gum; Chew 1 each As Needed for Smoking Cessation.  Dispense: 1 each; Refill: 3  Complete cessation advised. Recent LDCT chest reviewed and results discussed with patient. Recommend continuing with annual lung cancer screening scans.    3. Pulmonary emphysema, unspecified emphysema type (HCC)  Normal A1AT genotype    4. Coronary artery disease involving native coronary artery of native heart without angina pectoris  S/p recent stent placement with subsequent improvement in dyspnea.      Follow Up:   Keep March 2023 appt as previously scheduled  The patient was counseled on diagnostic results, risks and benefits of treatment options, risk factor modifications and the importance of treatment compliance. The patient was advised to contact the clinic with concerns or worsening symptoms.     JAN Charles   Pulmonary Medicine [place]     Transcribed from ambient dictation for Fabi Aldana  APRN by [place].  11/04/22   12:36 EDT    Patient or patient representative verbalized consent to the visit recording.  I have personally performed the services described in this document as transcribed by the above individual, and it is both accurate and complete.

## 2023-03-02 ENCOUNTER — OFFICE VISIT (OUTPATIENT)
Dept: PULMONOLOGY | Facility: CLINIC | Age: 68
End: 2023-03-02
Payer: MEDICARE

## 2023-03-02 VITALS
WEIGHT: 211 LBS | RESPIRATION RATE: 16 BRPM | DIASTOLIC BLOOD PRESSURE: 72 MMHG | HEIGHT: 70 IN | OXYGEN SATURATION: 95 % | HEART RATE: 70 BPM | BODY MASS INDEX: 30.21 KG/M2 | SYSTOLIC BLOOD PRESSURE: 122 MMHG

## 2023-03-02 DIAGNOSIS — F17.210 NICOTINE DEPENDENCE, CIGARETTES, UNCOMPLICATED: ICD-10-CM

## 2023-03-02 DIAGNOSIS — J30.89 OTHER ALLERGIC RHINITIS: ICD-10-CM

## 2023-03-02 DIAGNOSIS — J44.9 CHRONIC OBSTRUCTIVE PULMONARY DISEASE, UNSPECIFIED COPD TYPE: Primary | ICD-10-CM

## 2023-03-02 DIAGNOSIS — J43.9 PULMONARY EMPHYSEMA, UNSPECIFIED EMPHYSEMA TYPE: ICD-10-CM

## 2023-03-02 PROCEDURE — 99214 OFFICE O/P EST MOD 30 MIN: CPT | Performed by: NURSE PRACTITIONER

## 2023-03-02 RX ORDER — ATORVASTATIN CALCIUM 40 MG/1
TABLET, FILM COATED ORAL EVERY 24 HOURS
COMMUNITY

## 2023-03-02 RX ORDER — AZELASTINE 1 MG/ML
1 SPRAY, METERED NASAL 2 TIMES DAILY PRN
Qty: 1 EACH | Refills: 5 | Status: SHIPPED | OUTPATIENT
Start: 2023-03-02

## 2023-03-02 RX ORDER — ALBUTEROL SULFATE 90 UG/1
2 AEROSOL, METERED RESPIRATORY (INHALATION) EVERY 6 HOURS PRN
Qty: 18 G | Refills: 3 | Status: SHIPPED | OUTPATIENT
Start: 2023-03-02

## 2023-03-02 RX ORDER — CETIRIZINE HYDROCHLORIDE 10 MG/1
TABLET ORAL EVERY 24 HOURS
COMMUNITY

## 2023-03-02 RX ORDER — AMLODIPINE BESYLATE 5 MG/1
TABLET ORAL EVERY 24 HOURS
COMMUNITY

## 2023-03-02 RX ORDER — MELOXICAM 7.5 MG/1
TABLET ORAL EVERY 24 HOURS
COMMUNITY

## 2023-03-02 RX ORDER — TIOTROPIUM BROMIDE AND OLODATEROL 3.124; 2.736 UG/1; UG/1
2 SPRAY, METERED RESPIRATORY (INHALATION)
Qty: 1 EACH | Refills: 5 | Status: SHIPPED | OUTPATIENT
Start: 2023-03-02

## 2023-03-02 RX ORDER — AZELASTINE HYDROCHLORIDE, FLUTICASONE PROPIONATE 137; 50 UG/1; UG/1
SPRAY, METERED NASAL
COMMUNITY
Start: 2023-02-10 | End: 2023-03-02

## 2023-03-02 RX ORDER — GLYCOPYRROLATE AND FORMOTEROL FUMARATE 9; 4.8 UG/1; UG/1
AEROSOL, METERED RESPIRATORY (INHALATION)
COMMUNITY
Start: 2023-01-06

## 2023-03-02 RX ORDER — FLUTICASONE PROPIONATE 50 MCG
SPRAY, SUSPENSION (ML) NASAL EVERY 24 HOURS
COMMUNITY

## 2023-03-02 NOTE — PROGRESS NOTES
"Chief Complaint   Patient presents with   • Follow-up   • Shortness of Breath         Subjective   Brooks Brice is a 67 y.o. male.     History of Present Illness   Patient comes today for follow up of shortness of breath and COPD.     Symptoms have been stable since the last clinic visit. Patient reports no recent exacerbations.     Patient is using medications, as prescribed. He uses Stiolto daily. He uses DIPAK 1 time a week on average.     Overall, he is feeling better.     Exercise tolerance has also remained stable.     Continues to smoke 1.5 pack per day.    He does not use oxygen.     He had UGI and LGI and has felt better since constipation resolved.     He has cardiac stent placed in December 2022 and felt better almost immediately as far a dyspnea on exertion.       The following portions of the patient's history were reviewed and updated as appropriate: allergies, current medications, past family history, past medical history, past social history and past surgical history.      Review of Systems   HENT: Positive for sinus pressure. Negative for sneezing and sore throat.    Respiratory: Negative for cough (with drainage sometimes), chest tightness, shortness of breath and wheezing (some).        Objective   Visit Vitals  /72   Pulse 70   Resp 16   Ht 177.8 cm (70\") Comment: pt reported   Wt 95.7 kg (211 lb)   SpO2 95%   BMI 30.28 kg/m²         Physical Exam  Vitals reviewed.   HENT:      Head: Atraumatic.      Mouth/Throat:      Mouth: Mucous membranes are moist.   Eyes:      Extraocular Movements: Extraocular movements intact.   Cardiovascular:      Rate and Rhythm: Normal rate and regular rhythm.   Pulmonary:      Effort: Pulmonary effort is normal. No respiratory distress.   Musculoskeletal:      Cervical back: Neck supple.      Comments: Gait normal.   Neurological:      Mental Status: He is alert and oriented to person, place, and time.           Assessment & Plan   Diagnoses and all orders for " this visit:    1. Chronic obstructive pulmonary disease, unspecified COPD type (HCC) (Primary)  -     tiotropium bromide-olodaterol (Stiolto Respimat) 2.5-2.5 MCG/ACT aerosol solution inhaler; Inhale 2 puffs Daily.  Dispense: 1 each; Refill: 5    2. Other allergic rhinitis    3. Nicotine dependence, cigarettes, uncomplicated  -      CT Chest Low Dose Cancer Screening WO; Future    4. Pulmonary emphysema, unspecified emphysema type (HCC)    Other orders  -     albuterol sulfate  (90 Base) MCG/ACT inhaler; Inhale 2 puffs Every 6 (Six) Hours As Needed for Wheezing.  Dispense: 18 g; Refill: 3  -     azelastine (ASTELIN) 0.1 % nasal spray; 1 spray into the nostril(s) as directed by provider 2 (Two) Times a Day As Needed for Rhinitis or Allergies. Use in each nostril as directed  Dispense: 1 each; Refill: 5           Return in about 8 months (around 11/2/2023) for Recheck, For Dr. Mancia, Imaging studies.    DISCUSSION (if any):  PFT 11/2022 consistent with severe obstruction.     No change to the current medications has been made. He should continue using Stiolto daily and DIPAK prn.     He should continue Astelin for allergic rhinitis.    Compliance with medications stressed.     Side effects of prescribed medications discussed with the patient    Patient was strongly encouraged to quit smoking as soon as possible. We discussed keeping cigarettes in the truck so he has to physically walk to get a cigarette.     The patient belongs to the risk group for which lung cancer screening has been recommended. He will be due for LDCT in August and this has been ordered today.     Study Result    Narrative & Impression   PROCEDURE: CT CHEST LOW DOSE CANCER SCREENING WO-     HISTORY: Lung cancer screening, >= 20 pk-yr smoking history (Age >=  50y); F17.210-Nicotine dependence, cigarettes, uncomplicated     TECHNIQUE: Axial images were obtained from the thoracic inlet through  the upper abdomen per the low-dose protocol.  Coronal images were  reformatted. DLP 66.59 mGy.cm; CTDI 1.62 mGy     FINDINGS: There is severe emphysema. There are numerous calcified  granulomas. There is no suspicious pulmonary nodule. There is no  airspace disease. The heart is normal in size. The aorta is normal in  caliber. There are no pleural or pericardial effusions. The visualized  upper abdomen is unremarkable. Bone windows reveal no acute osseous  abnormalities.     IMPRESSION:  No evidence of neoplasm. Lung RADS category 1.     RECOMMENDATIONS:Annual low-dose chest CT.            Dictated utilizing Dragon dictation.    This document was electronically signed by JAN Payne March 2, 2023  11:10 EST

## 2023-08-29 ENCOUNTER — PATIENT OUTREACH (OUTPATIENT)
Dept: INFUSION THERAPY | Facility: HOSPITAL | Age: 68
End: 2023-08-29
Payer: MEDICARE

## 2023-08-29 NOTE — SIGNIFICANT NOTE
Patient called regarding screening appt. Patient desires to screen. Central schedule # provided to patient to schedule.

## 2023-09-12 ENCOUNTER — HOSPITAL ENCOUNTER (OUTPATIENT)
Dept: CT IMAGING | Facility: HOSPITAL | Age: 68
Discharge: HOME | End: 2023-09-12
Admitting: NURSE PRACTITIONER
Payer: MEDICARE

## 2023-09-12 DIAGNOSIS — F17.210 NICOTINE DEPENDENCE, CIGARETTES, UNCOMPLICATED: ICD-10-CM

## 2023-09-12 PROCEDURE — 71271 CT THORAX LUNG CANCER SCR C-: CPT

## 2023-11-15 DIAGNOSIS — J44.9 CHRONIC OBSTRUCTIVE PULMONARY DISEASE, UNSPECIFIED COPD TYPE: ICD-10-CM

## 2023-11-15 RX ORDER — TIOTROPIUM BROMIDE AND OLODATEROL 3.124; 2.736 UG/1; UG/1
2 SPRAY, METERED RESPIRATORY (INHALATION) DAILY
Qty: 4 G | Refills: 5 | OUTPATIENT
Start: 2023-11-15

## 2023-11-21 DIAGNOSIS — J44.9 CHRONIC OBSTRUCTIVE PULMONARY DISEASE, UNSPECIFIED COPD TYPE: ICD-10-CM

## 2023-11-21 RX ORDER — TIOTROPIUM BROMIDE AND OLODATEROL 3.124; 2.736 UG/1; UG/1
2 SPRAY, METERED RESPIRATORY (INHALATION) DAILY
Qty: 4 G | Refills: 0 | Status: SHIPPED | OUTPATIENT
Start: 2023-11-21

## 2023-12-13 DIAGNOSIS — J44.9 CHRONIC OBSTRUCTIVE PULMONARY DISEASE, UNSPECIFIED COPD TYPE: ICD-10-CM

## 2023-12-13 RX ORDER — TIOTROPIUM BROMIDE AND OLODATEROL 3.124; 2.736 UG/1; UG/1
2 SPRAY, METERED RESPIRATORY (INHALATION) DAILY
Qty: 4 G | Refills: 5 | OUTPATIENT
Start: 2023-12-13

## 2024-03-13 RX ORDER — ALBUTEROL SULFATE 90 UG/1
AEROSOL, METERED RESPIRATORY (INHALATION)
Qty: 18 G | Refills: 3 | OUTPATIENT
Start: 2024-03-13

## 2024-04-08 RX ORDER — ALBUTEROL SULFATE 90 UG/1
AEROSOL, METERED RESPIRATORY (INHALATION)
Qty: 18 G | Refills: 3 | OUTPATIENT
Start: 2024-04-08

## (undated) DEVICE — DEV INFL MONARCH 25W

## (undated) DEVICE — GW INQWIRE FC PTFE STD J/1.5 .035 260

## (undated) DEVICE — CATH DIAG EXPO M/ PK 5F FL4/FR4 PIG

## (undated) DEVICE — NC TREK CORONARY DILATATION CATHETER 3.5 MM X 20 MM / RAPID-EXCHANGE: Brand: NC TREK

## (undated) DEVICE — DEV COMP RAD PRELUDESYNC 24CM

## (undated) DEVICE — GW LUGE .014 182 CM

## (undated) DEVICE — INTRO SHEATH ART/FEM ENGAGE .035 6F12CM

## (undated) DEVICE — PK CATH CARD 10

## (undated) DEVICE — ST INF PRI SMRTSTE 20DRP 2VLV 24ML 117

## (undated) DEVICE — LHK- LEFT HEART KIT BAPTIST: Brand: MEDLINE INDUSTRIES, INC.

## (undated) DEVICE — GUIDE CATHETER: Brand: MACH1™

## (undated) DEVICE — ADULT, W/LG. BACK PAD, RADIOTRANSPARENT ELEMENT AND LEAD WIRE: Brand: DEFIBRILLATION ELECTRODES

## (undated) DEVICE — CATH DIAG EXPO .045 FL3  5F 100CM

## (undated) DEVICE — CATH DIAG EXPO .045 FL3.5 5F 100CM